# Patient Record
Sex: MALE | URBAN - METROPOLITAN AREA
[De-identification: names, ages, dates, MRNs, and addresses within clinical notes are randomized per-mention and may not be internally consistent; named-entity substitution may affect disease eponyms.]

---

## 2024-02-19 ENCOUNTER — TELEPHONE (OUTPATIENT)
Dept: PEDIATRICS | Facility: CLINIC | Age: 19
End: 2024-02-19
Payer: COMMERCIAL

## 2024-02-19 NOTE — TELEPHONE ENCOUNTER
Shon left a . He would like a referral placed for shoulder pain to a chiropractor at [facility]. His father has used [facility] and had a good experience. Thanks     [phone]

## 2024-02-29 ENCOUNTER — OFFICE VISIT (OUTPATIENT)
Dept: PEDIATRICS | Facility: CLINIC | Age: 19
End: 2024-02-29
Payer: COMMERCIAL

## 2024-02-29 VITALS — TEMPERATURE: 98.3 F | WEIGHT: 149.6 LBS

## 2024-02-29 DIAGNOSIS — G89.29 UPPER BACK PAIN, CHRONIC: Primary | ICD-10-CM

## 2024-02-29 DIAGNOSIS — M54.9 UPPER BACK PAIN, CHRONIC: Primary | ICD-10-CM

## 2024-02-29 PROBLEM — Q67.6 PECTUS EXCAVATUM: Status: ACTIVE | Noted: 2024-02-29

## 2024-02-29 PROBLEM — F32.A DEPRESSION: Status: RESOLVED | Noted: 2024-02-29 | Resolved: 2024-02-29

## 2024-02-29 PROBLEM — J01.90 ACUTE SINUSITIS: Status: RESOLVED | Noted: 2024-02-29 | Resolved: 2024-02-29

## 2024-02-29 PROBLEM — S69.92XA INJURY OF LEFT WRIST: Status: RESOLVED | Noted: 2024-02-29 | Resolved: 2024-02-29

## 2024-02-29 PROBLEM — A09 GASTROENTERITIS, INFECTIOUS: Status: RESOLVED | Noted: 2024-02-29 | Resolved: 2024-02-29

## 2024-02-29 PROCEDURE — 99213 OFFICE O/P EST LOW 20 MIN: CPT | Performed by: PEDIATRICS

## 2024-02-29 NOTE — PROGRESS NOTES
Shon Jiménez is a 18 y.o. male who presents for Back Pain (Lumbar and shoulder pain).      HPI  Shon has had chronic issues with bilateral upper back and shoulder pain.  He is concerned this may be in part due to his pectus excavatum.  He is lifting weight 3 to 4 days per week and he often has pain with bench pressing and other exercises that move his shoulders.  It is worsening over time.  He did not have one acute injury.  Objective   Temp 36.8 °C (98.3 °F) (Temporal)   Wt 67.9 kg (149 lb 9.6 oz)     Physical Exam  Gen: well appearing  Musculoskeletal: Pectus excavatum noted.  Shoulder exam normal.  Neck normal.  There is some mild pain with extension of arms overhead.  Upper extremity strength/tone normal.    Assessment/Plan   Shon has upper back pain of a suspected muscular etiology.  He was referred to PT.  Today we discussed a typical course, symptomatic treatment, and signs of worsening/when to seek medical care.

## 2024-04-05 ENCOUNTER — EVALUATION (OUTPATIENT)
Dept: PHYSICAL THERAPY | Facility: CLINIC | Age: 19
End: 2024-04-05
Payer: COMMERCIAL

## 2024-04-05 DIAGNOSIS — M54.9 UPPER BACK PAIN, CHRONIC: ICD-10-CM

## 2024-04-05 DIAGNOSIS — M25.511 BILATERAL SHOULDER PAIN: Primary | ICD-10-CM

## 2024-04-05 DIAGNOSIS — M54.2 PAIN, NECK: ICD-10-CM

## 2024-04-05 DIAGNOSIS — G89.29 UPPER BACK PAIN, CHRONIC: ICD-10-CM

## 2024-04-05 DIAGNOSIS — M25.512 BILATERAL SHOULDER PAIN: Primary | ICD-10-CM

## 2024-04-05 PROCEDURE — 97161 PT EVAL LOW COMPLEX 20 MIN: CPT | Mod: GP | Performed by: PHYSICAL THERAPIST

## 2024-04-05 PROCEDURE — 97110 THERAPEUTIC EXERCISES: CPT | Mod: GP | Performed by: PHYSICAL THERAPIST

## 2024-04-05 ASSESSMENT — ENCOUNTER SYMPTOMS
LOSS OF SENSATION IN FEET: 0
OCCASIONAL FEELINGS OF UNSTEADINESS: 0
DEPRESSION: 0

## 2024-04-05 ASSESSMENT — PAIN SCALES - GENERAL: PAINLEVEL_OUTOF10: 1

## 2024-04-05 ASSESSMENT — PAIN - FUNCTIONAL ASSESSMENT: PAIN_FUNCTIONAL_ASSESSMENT: 0-10

## 2024-04-05 NOTE — PROGRESS NOTES
Physical Therapy  Physical Therapy Orthopedic Evaluation    Patient Name: Shon Jiménez  MRN: [id number]  Today's Date: 4/5/2024  Time Calculation  Start Time: 0747  Stop Time: 0825  Time Calculation (min): 38 min    Visit Count: 1/30  Auth:yes  Insurance: [employer]      Current Problem  1. Bilateral shoulder pain  Follow Up In Physical Therapy      2. Upper back pain, chronic  Referral to Physical Therapy    Follow Up In Physical Therapy      3. Pain, neck  Follow Up In Physical Therapy          General:  General  Reason for Referral: B shoulder and neck pain  Referred By: Earl ROLDAN    Precautions:  Precautions  STEADI Fall Risk Score (The score of 4 or more indicates an increased risk of falling): 0    Pain:  Pain Assessment: 0-10  Pain Score: 1  Pain Location: Shoulder    Subjective:   Pt is a 18 y.o. y.o male presenting to the clinic with B shoulder and upper back pain. L shoulder worse. Reports that the symptoms have been present 4/5/2022. Reports JUANA insidious. Reports pain is currently 1/10, worst 6-7/10, best 0/10. Aggravated with benching, exercise generally, improved/relieved with rest. Reports the pain is achy in nature in anterior shoulder. Denies any radiating pain or N/T. Denies any previous neck or shoulder injuries previously. Reports RUE. Denies any imaging being completed. PMHx includes denies all. Denies any Red Flags.    Prior Level of Function (PLOF)  Patient previously independent with all ADLs  Exercise/Physical Activity: 3-4x week  Work/School: [employer]    Patients Living Environment: Reviewed and no concern    Primary Language: English    Patient's Goal(s) for Therapy: return to pain free working out.     Objective:  Shoulder     Cervical AROM  Cervical flexion: (80°): 100%  Cervical extension: (50°): 100%  Cervical Rotation: (80°): 100% P!  Cervical rotation left: (80°): 100% P!  Cervical sidebend right: (45°): 75%  Cervical sidebend left: (45°): 75%  Shoulder AROM  R Shoulder flexion: (180°):  170  L Shoulder flexion: (180°): 170  R shoulder abduction: (180°): 180  L Shoulder abduction: (180°): 180  R Shoulder ER: (90°): 90  L shoulder ER: (90°): 90  R shoulder IR: (70°): T4  L shoulder IR: (70°): T3  Shoulder PROM  R shoulder flexion: (180°): 180  L shoulder flexion: (180°): 180  R shoulder ER: (90°): 110  L shoulder ER: (90°): 90  R shoulder IR: (70°): 45  L shoulder IR: (70°): 30    Shoulder Strength  R shoulder flexion: (5/5): 5  L shoulder flexion: (5/5): 4  R shoulder abduction: (5/5): 5  L shoulder abduction: (5/5): 4  R shoulder ER: (5/5): 5  L shoulder ER: (5/5): 4  R shoulder IR: (5/5) : 5  L shoulder IR: (5/5): 5  Scapular MMT  R lower trapezius: (5/5): 4  L lower trapezius: (5/5): 4  R middle trapezius: (5/5): 4  L middle trapezius: (5/5): 4  L rhomboids: (5/5): 4  R serratus anterior: (5/5): 4  L serratus anterior: (5/5): 4    Shoulder Special  Painful arc: Negative: Positive  Shoulder Impingement (If 3 are positive likelihood ration +10.56 and -0.17; If 2 are positive likelihoood ration +5.03): Positive Hare Julien  Neer: (Negative): positive  Painful arc: (Negative): positive    Effusion: 0    Posture: Forward head and Rounded shoulder    Outcome Measures:  Other Measures  Oswestry Disablity Index (SAIRA): 4, 8%   Assess quick dash next appointment      Treatments:   Therapeutic Exercise  Therapeutic Exercise Activity 1: scapular row BTB 3x10  Therapeutic Exercise Activity 2: lat row BTB 3x10  Therapeutic Exercise Activity 3: modified cross body stretch x1 min B  Therapeutic Exercise Activity 4: shoulder ER isometric 10x10 sec B  Therapeutic Exercise Activity 5: cervical retractions 3x10    Assessment: Pt is a 18 y.o. y.o male presenting to the clinic with shoulder and upper back pain.  Pt demonstrates limitations in  shoulder and scapular Strength, thoracic ROM, Flexibility of UT, LS, lats, Dynamic Motor Control, and Pain. As a result, is limiting their ability to perform ADL's and their  functional activities. Signs and symptoms present are consistent with B shoulder impingement. Pt demonstrates to be a good candidate for PT, where the benefit would benefit from Strengthening, ROM, Stretching, and Motor Control Training, in order to return to PLOF.         EDUCATION: home exercise program, plan of care, activity modifications, pain management, and injury pathology   Access Code: [email or web address]  URL: [email or web address]  Date: 04/05/2024  Prepared by: Tavo Fishman    Exercises  - Seated Cervical Retraction  - 1 x daily - 7 x weekly - 3 sets - 10 reps  - Standing Cross Body Shoulder Stretch at Wall  - 1 x daily - 7 x weekly - 1--2 min hold  - Standing Shoulder Row with Anchored Resistance  - 1 x daily - 7 x weekly - 3 sets - 10 reps  - Shoulder extension with resistance - Neutral  - 1 x daily - 7 x weekly - 3 sets - 10 reps  - Standing Isometric Shoulder External Rotation with Doorway  - 1 x daily - 7 x weekly - 10 reps - 10 sec hold    Plan:      Planned Interventions include: therapeutic exercise, self-care home management, manual therapy, therapeutic activities, gait training, neuromuscular coordination, vasopneumatic, dry needling, aquatic therapy  Frequency: 1 x Week  Duration: 12 Weeks  Good potential for rehab  Pt agreed with this plan.     Goals:  Active       PT Problem       PT Goal 1       Start:  04/05/24    Expected End:  05/17/24       ?Short Term Goals  1. Pt will demonstrated improvements in shoulder/scapular strength, specifically to 4+/5 in 6 weeks, in order to progress back to PLOF.    2. Pt will demonstrate improvements in shoulder Passive ROM, specifically flexion >170 in 6 weeks, in order to improve functional level.     3. Pt will demonstrate the ability to perform overhead activities with minimal pain (2-3/10 pain) in 6 weeks, in order to return to pain free prior level.     4. Pt will demonstrate improved standing posture and proper shoulder and scapular  control with overhead activities in 6 weeks, in order to decrease stress and strain with activities.    5. Pt will demonstrate Ind ability with HEP.           PT Goal 2       Start:  04/05/24    Expected End:  06/28/24       Long Term Goals  1. Pt will demonstrated improvements and symmetry in shoulder/scapular strength to 5/5 in 12 weeks, in order to return to PLOF.    2. Pt will demonstrate full and symmetrical Active/Passive shoulder ROM, specifically in 12 weeks, in order to return to prior overhead function.    3. Pt will demonstrate the ability to complete all functional activities with no pain in 12 weeks.     4. Decrease quick dash by 6 points in 12 weeks.              Tavo Fishman, PT, SCS, CSCS

## 2024-04-12 ENCOUNTER — TREATMENT (OUTPATIENT)
Dept: PHYSICAL THERAPY | Facility: CLINIC | Age: 19
End: 2024-04-12
Payer: COMMERCIAL

## 2024-04-12 DIAGNOSIS — M54.2 PAIN, NECK: ICD-10-CM

## 2024-04-12 DIAGNOSIS — M25.512 BILATERAL SHOULDER PAIN: ICD-10-CM

## 2024-04-12 DIAGNOSIS — M54.9 UPPER BACK PAIN, CHRONIC: ICD-10-CM

## 2024-04-12 DIAGNOSIS — M25.511 BILATERAL SHOULDER PAIN: ICD-10-CM

## 2024-04-12 DIAGNOSIS — G89.29 UPPER BACK PAIN, CHRONIC: ICD-10-CM

## 2024-04-12 PROCEDURE — 97110 THERAPEUTIC EXERCISES: CPT | Mod: GP | Performed by: PHYSICAL THERAPIST

## 2024-04-12 ASSESSMENT — PAIN - FUNCTIONAL ASSESSMENT: PAIN_FUNCTIONAL_ASSESSMENT: 0-10

## 2024-04-12 ASSESSMENT — PAIN SCALES - GENERAL: PAINLEVEL_OUTOF10: 0 - NO PAIN

## 2024-04-12 NOTE — PROGRESS NOTES
Physical Therapy  Physical Therapy Treatment    Patient Name: Shon Jiménez  MRN: [id number]  Today's Date: 4/12/2024  Time Calculation  Start Time: 0745  Stop Time: 0825  Time Calculation (min): 40 min    Visit Count: 2/30  Auth:yes  Insurance: [facility]  15 visits including today thru 07/19/24     Current Problem  1. Upper back pain, chronic  Follow Up In Physical Therapy      2. Bilateral shoulder pain  Follow Up In Physical Therapy      3. Pain, neck  Follow Up In Physical Therapy          General  Reason for Referral: B shoulder and neck pain  Referred By: Earl ROLDAN    Pain  Pain Assessment: 0-10  Pain Score: 0 - No pain  Pain Location: Shoulder    Subjective:   Patient reports that he was sore in his scapular muscles for two days after previous session, but improved now.     HEP Compliance: good.     Objective:   Rounded shoulders, forward head.    Treatments:   Therapeutic Exercise  Therapeutic Exercise Activity 1: cervical snag ext x30  Therapeutic Exercise Activity 2: cervical retractions on pad x30  Therapeutic Exercise Activity 3: cervical retraction w/rotation x20  Therapeutic Exercise Activity 4: icepickers 3x12 GTB  Therapeutic Exercise Activity 5: pec stretch x1 min B  Therapeutic Exercise Activity 6: thoracic ext 4x10 over half bolster  Therapeutic Exercise Activity 7: open books x15 B  Therapeutic Exercise Activity 8: Prone T 6x6 sec B  Therapeutic Exercise Activity 9: Prone Y 6x6 sec B      Assessment: The focus of the session was Strengthening and soft tissue massage. The pt demonstrated Good tolerance to the noted exercises today. The pt is demonstrated Good progress in skilled rehab at this time. The pt is still limited in overall Strength at this time. The pt continues to be a good candidate for skilled PT, in order to further improve Strength.          Education: Access Code: [email or web address]  URL: [email or web address]  Date: 04/12/2024  Prepared by: Tavo Redman  -  Seated Cervical Retraction  - 1 x daily - 7 x weekly - 3 sets - 10 reps  - Supine Cervical Rotation AROM on Flat Ball  - 1 x daily - 7 x weekly - 2 sets - 10 reps  - Doorway Pec Stretch at 60 Degrees Abduction with Arm Straight  - 1 x daily - 7 x weekly - 1-2 min hold  - Standing Cross Body Shoulder Stretch at Wall  - 1 x daily - 7 x weekly - 1--2 min hold  - Shoulder extension with resistance - Neutral  - 1 x daily - 7 x weekly - 3 sets - 10 reps  - Shoulder External Rotation and Scapular Retraction with Resistance  - 1 x daily - 7 x weekly - 3 sets - 12 reps  - Prone Single Arm Shoulder Horizontal Abduction with Scapular Retraction and Palm Down  - 1 x daily - 7 x weekly - 2 sets - 6 reps - 6 sec hold    Plan: continue with scapular strengthening.        Goals:  Active       PT Problem       PT Goal 1       Start:  04/05/24    Expected End:  05/17/24       ?Short Term Goals  1. Pt will demonstrated improvements in shoulder/scapular strength, specifically to 4+/5 in 6 weeks, in order to progress back to PLOF.    2. Pt will demonstrate improvements in shoulder Passive ROM, specifically flexion >170 in 6 weeks, in order to improve functional level.     3. Pt will demonstrate the ability to perform overhead activities with minimal pain (2-3/10 pain) in 6 weeks, in order to return to pain free prior level.     4. Pt will demonstrate improved standing posture and proper shoulder and scapular control with overhead activities in 6 weeks, in order to decrease stress and strain with activities.    5. Pt will demonstrate Ind ability with HEP.           PT Goal 2       Start:  04/05/24    Expected End:  06/28/24       Long Term Goals  1. Pt will demonstrated improvements and symmetry in shoulder/scapular strength to 5/5 in 12 weeks, in order to return to PLOF.    2. Pt will demonstrate full and symmetrical Active/Passive shoulder ROM, specifically in 12 weeks, in order to return to prior overhead function.    3. Pt will  demonstrate the ability to complete all functional activities with no pain in 12 weeks.     4. Decrease quick dash by 6 points in 12 weeks.              Tavo Fishman, PT, SCS, CSCS

## 2024-04-15 ENCOUNTER — TREATMENT (OUTPATIENT)
Dept: PHYSICAL THERAPY | Facility: CLINIC | Age: 19
End: 2024-04-15
Payer: COMMERCIAL

## 2024-04-15 DIAGNOSIS — M54.2 PAIN, NECK: ICD-10-CM

## 2024-04-15 DIAGNOSIS — G89.29 UPPER BACK PAIN, CHRONIC: ICD-10-CM

## 2024-04-15 DIAGNOSIS — M54.9 UPPER BACK PAIN, CHRONIC: ICD-10-CM

## 2024-04-15 DIAGNOSIS — M25.512 BILATERAL SHOULDER PAIN: ICD-10-CM

## 2024-04-15 DIAGNOSIS — M25.511 BILATERAL SHOULDER PAIN: ICD-10-CM

## 2024-04-15 PROCEDURE — 97110 THERAPEUTIC EXERCISES: CPT | Mod: GP | Performed by: PHYSICAL THERAPIST

## 2024-04-15 ASSESSMENT — PAIN SCALES - GENERAL: PAINLEVEL_OUTOF10: 0 - NO PAIN

## 2024-04-15 ASSESSMENT — PAIN - FUNCTIONAL ASSESSMENT: PAIN_FUNCTIONAL_ASSESSMENT: 0-10

## 2024-04-15 NOTE — PROGRESS NOTES
Physical Therapy  Physical Therapy Treatment    Patient Name: Shon Jiménez  MRN: [id number]  Today's Date: 4/15/2024  Time Calculation  Start Time: 1713  Stop Time: 1753  Time Calculation (min): 40 min    Visit Count: 3/30  Auth:yes  Insurance: [facility]  15 visits including today thru 07/19/24     Current Problem  1. Upper back pain, chronic  Follow Up In Physical Therapy      2. Bilateral shoulder pain  Follow Up In Physical Therapy      3. Pain, neck  Follow Up In Physical Therapy          General  Reason for Referral: B shoulder and neck pain  Referred By: Earl ROLDAN    Pain  Pain Assessment: 0-10  Pain Score: 0 - No pain  Pain Location: Shoulder    Subjective:   Patient reports that he is doing well, no pain.    HEP Compliance: good.     Objective:   Improving scapular control    Treatments:   Therapeutic Exercise  Therapeutic Exercise Activity 1: ATYTA's GTB 3x8  Therapeutic Exercise Activity 2: [place] GTB 3x8  Therapeutic Exercise Activity 3: 90/90 carry 4 kg x3 laps B  Therapeutic Exercise Activity 4: Prone row 8 kg 3x12 B  Therapeutic Exercise Activity 5: serratus wall slide 3x10  Therapeutic Exercise Activity 6: wall push up plus 3x10  Therapeutic Exercise Activity 7: open books x15 B  Therapeutic Exercise Activity 8: icepickers 3x12 BTB      Assessment: The focus of the session was Strengthening. The pt demonstrated Good tolerance to the noted exercises today. The pt is demonstrated Good progress in skilled rehab at this time. The pt is still limited in overall Strength, Motor control, and Pain at this time. The pt continues to be a good candidate for skilled PT, in order to further improve Strength, Motor control, and Pain.         Education: Continue with noted HEP.     Plan: continue with scapular strengthening.        Goals:  Active       PT Problem       PT Goal 1       Start:  04/05/24    Expected End:  05/17/24       ?Short Term Goals  1. Pt will demonstrated improvements in shoulder/scapular  strength, specifically to 4+/5 in 6 weeks, in order to progress back to PLOF.    2. Pt will demonstrate improvements in shoulder Passive ROM, specifically flexion >170 in 6 weeks, in order to improve functional level.     3. Pt will demonstrate the ability to perform overhead activities with minimal pain (2-3/10 pain) in 6 weeks, in order to return to pain free prior level.     4. Pt will demonstrate improved standing posture and proper shoulder and scapular control with overhead activities in 6 weeks, in order to decrease stress and strain with activities.    5. Pt will demonstrate Ind ability with HEP.           PT Goal 2       Start:  04/05/24    Expected End:  06/28/24       Long Term Goals  1. Pt will demonstrated improvements and symmetry in shoulder/scapular strength to 5/5 in 12 weeks, in order to return to PLOF.    2. Pt will demonstrate full and symmetrical Active/Passive shoulder ROM, specifically in 12 weeks, in order to return to prior overhead function.    3. Pt will demonstrate the ability to complete all functional activities with no pain in 12 weeks.     4. Decrease quick dash by 6 points in 12 weeks.              Tavo Fishman, PT, SCS, CSCS

## 2024-04-22 ENCOUNTER — TREATMENT (OUTPATIENT)
Dept: PHYSICAL THERAPY | Facility: CLINIC | Age: 19
End: 2024-04-22
Payer: COMMERCIAL

## 2024-04-22 DIAGNOSIS — G89.29 UPPER BACK PAIN, CHRONIC: ICD-10-CM

## 2024-04-22 DIAGNOSIS — M25.511 BILATERAL SHOULDER PAIN: ICD-10-CM

## 2024-04-22 DIAGNOSIS — M54.9 UPPER BACK PAIN, CHRONIC: ICD-10-CM

## 2024-04-22 DIAGNOSIS — M54.2 PAIN, NECK: ICD-10-CM

## 2024-04-22 DIAGNOSIS — M25.512 BILATERAL SHOULDER PAIN: ICD-10-CM

## 2024-04-22 PROCEDURE — 97110 THERAPEUTIC EXERCISES: CPT | Mod: GP | Performed by: PHYSICAL THERAPIST

## 2024-04-22 ASSESSMENT — PAIN SCALES - GENERAL: PAINLEVEL_OUTOF10: 0 - NO PAIN

## 2024-04-22 ASSESSMENT — PAIN - FUNCTIONAL ASSESSMENT: PAIN_FUNCTIONAL_ASSESSMENT: 0-10

## 2024-04-22 NOTE — PROGRESS NOTES
Physical Therapy  Physical Therapy Treatment    Patient Name: Shon Jiménez  MRN: [id number]  Today's Date: 4/22/2024  Time Calculation  Start Time: 1618  Stop Time: 1700  Time Calculation (min): 42 min    Visit Count: 4/30  Auth:yes  Insurance: [facility]  15 visits including today thru 07/19/24     Current Problem  1. Upper back pain, chronic  Follow Up In Physical Therapy      2. Bilateral shoulder pain  Follow Up In Physical Therapy      3. Pain, neck  Follow Up In Physical Therapy          General  Reason for Referral: B shoulder and neck pain  Referred By: Earl ROLDAN    Pain  Pain Assessment: 0-10  Pain Score: 0 - No pain  Pain Location: Shoulder    Subjective:   Patient reports that shoulder is feeling good.     HEP Compliance: good.     Objective:   Decreased eccentric control, increased bicep loading with floor pressing    Treatments:   Therapeutic Exercise  Therapeutic Exercise Activity 1: ATYTA's GTB 3x8  Therapeutic Exercise Activity 2: 90/90 carry 4 kg x3 laps B  Therapeutic Exercise Activity 3: overhead carry x4 kg  Therapeutic Exercise Activity 4: shoulder flexion with RTB 3x12  Therapeutic Exercise Activity 5: serratus wall slide 3x10  Therapeutic Exercise Activity 6: Floor pressing 3x8  Therapeutic Exercise Activity 7: wall push up plus 3x10      Assessment: The focus of the session was Strengthening and Dynamic Stability Training. The pt demonstrated Good tolerance to the noted exercises today. The pt is demonstrated Improving progress in skilled rehab at this time. The pt reported pain with floor pressing today in anterior shoulder. The pt is still limited in overall Strength, Motor control, and Pain at this time. The pt continues to be a good candidate for skilled PT, in order to further improve Strength, Motor control, and Pain.         Education: Addition of banded scaption raise    Plan: Continue with floor pressing mechanics.        Goals:  Active       PT Problem       PT Goal 1       Start:   04/05/24    Expected End:  05/17/24       ?Short Term Goals  1. Pt will demonstrated improvements in shoulder/scapular strength, specifically to 4+/5 in 6 weeks, in order to progress back to PLOF.    2. Pt will demonstrate improvements in shoulder Passive ROM, specifically flexion >170 in 6 weeks, in order to improve functional level.     3. Pt will demonstrate the ability to perform overhead activities with minimal pain (2-3/10 pain) in 6 weeks, in order to return to pain free prior level.     4. Pt will demonstrate improved standing posture and proper shoulder and scapular control with overhead activities in 6 weeks, in order to decrease stress and strain with activities.    5. Pt will demonstrate Ind ability with HEP.           PT Goal 2       Start:  04/05/24    Expected End:  06/28/24       Long Term Goals  1. Pt will demonstrated improvements and symmetry in shoulder/scapular strength to 5/5 in 12 weeks, in order to return to PLOF.    2. Pt will demonstrate full and symmetrical Active/Passive shoulder ROM, specifically in 12 weeks, in order to return to prior overhead function.    3. Pt will demonstrate the ability to complete all functional activities with no pain in 12 weeks.     4. Decrease quick dash by 6 points in 12 weeks.              Tavo Fishman, PT, SCS, CSCS

## 2024-04-29 ENCOUNTER — TREATMENT (OUTPATIENT)
Dept: PHYSICAL THERAPY | Facility: CLINIC | Age: 19
End: 2024-04-29
Payer: COMMERCIAL

## 2024-04-29 DIAGNOSIS — M25.511 BILATERAL SHOULDER PAIN: ICD-10-CM

## 2024-04-29 DIAGNOSIS — M54.2 PAIN, NECK: ICD-10-CM

## 2024-04-29 DIAGNOSIS — G89.29 UPPER BACK PAIN, CHRONIC: ICD-10-CM

## 2024-04-29 DIAGNOSIS — M25.512 BILATERAL SHOULDER PAIN: ICD-10-CM

## 2024-04-29 DIAGNOSIS — M54.9 UPPER BACK PAIN, CHRONIC: ICD-10-CM

## 2024-04-29 PROCEDURE — 97140 MANUAL THERAPY 1/> REGIONS: CPT | Mod: GP | Performed by: PHYSICAL THERAPIST

## 2024-04-29 PROCEDURE — 97110 THERAPEUTIC EXERCISES: CPT | Mod: GP | Performed by: PHYSICAL THERAPIST

## 2024-04-29 ASSESSMENT — PAIN SCALES - GENERAL: PAINLEVEL_OUTOF10: 0 - NO PAIN

## 2024-04-29 ASSESSMENT — PAIN - FUNCTIONAL ASSESSMENT: PAIN_FUNCTIONAL_ASSESSMENT: 0-10

## 2024-04-29 NOTE — PROGRESS NOTES
Physical Therapy  Physical Therapy Treatment    Patient Name: Shon Jiménez  MRN: [id number]  Today's Date: 4/29/2024  Time Calculation  Start Time: 1625  Stop Time: 1705  Time Calculation (min): 40 min    Visit Count: 5/30  Auth:yes  Insurance: [facility]  15 visits including today thru 07/19/24     Current Problem  1. Upper back pain, chronic  Follow Up In Physical Therapy      2. Bilateral shoulder pain  Follow Up In Physical Therapy      3. Pain, neck  Follow Up In Physical Therapy          General  Reason for Referral: B shoulder and neck pain  Referred By: Earl ROLDAN    Pain  Pain Assessment: 0-10  Pain Score: 0 - No pain  Pain Location: Shoulder    Subjective:   Patient reports that the exercises are well, does report symptoms around his clavicle intermittently bilaterally    HEP Compliance: good    Objective:   Decreased overhead endurance    Treatments:   Therapeutic Exercise  Therapeutic Exercise Activity 1: scapular punch 3x12 10#  Therapeutic Exercise Activity 2: floor press 3x12 10#  Therapeutic Exercise Activity 3: overhead carry x4 kg x3 laps B  Therapeutic Exercise Activity 4: overhead carry with band 4 kg x3 laps B  Therapeutic Exercise Activity 5: serratus wall slide BTB 3x12  Therapeutic Exercise Activity 6: wall push up plus 3x10    Manual Therapy  Manual Therapy Activity 1: GH grade III posterior, inferior, lateral  x10 min      Assessment: The focus of the session was Strengthening. The pt demonstrated Good tolerance to the noted exercises today. The pt is demonstrated Improving progress in skilled rehab at this time. Re-introduced light pressing in floor manner today with improved mechanics and no pain. Overhead carry was progressed to with a resistance band. The pt fatigue quickly with overhead carry progression today. The pt is still limited in overall Strength especially overhead at this time. The pt continues to be a good candidate for skilled PT, in order to further improve Strength, Motor  control, and Pain.         Education: Addition of light floor pressing and wall push ups 3x12 both    Plan: continue with overhead strengthening.        Goals:  Active       PT Problem       PT Goal 1       Start:  04/05/24    Expected End:  05/17/24       ?Short Term Goals  1. Pt will demonstrated improvements in shoulder/scapular strength, specifically to 4+/5 in 6 weeks, in order to progress back to PLOF.    2. Pt will demonstrate improvements in shoulder Passive ROM, specifically flexion >170 in 6 weeks, in order to improve functional level.     3. Pt will demonstrate the ability to perform overhead activities with minimal pain (2-3/10 pain) in 6 weeks, in order to return to pain free prior level.     4. Pt will demonstrate improved standing posture and proper shoulder and scapular control with overhead activities in 6 weeks, in order to decrease stress and strain with activities.    5. Pt will demonstrate Ind ability with HEP.           PT Goal 2       Start:  04/05/24    Expected End:  06/28/24       Long Term Goals  1. Pt will demonstrated improvements and symmetry in shoulder/scapular strength to 5/5 in 12 weeks, in order to return to PLOF.    2. Pt will demonstrate full and symmetrical Active/Passive shoulder ROM, specifically in 12 weeks, in order to return to prior overhead function.    3. Pt will demonstrate the ability to complete all functional activities with no pain in 12 weeks.     4. Decrease quick dash by 6 points in 12 weeks.              Tavo Fishman, PT, SCS, CSCS

## 2024-05-08 ENCOUNTER — TREATMENT (OUTPATIENT)
Dept: PHYSICAL THERAPY | Facility: CLINIC | Age: 19
End: 2024-05-08
Payer: COMMERCIAL

## 2024-05-08 DIAGNOSIS — M25.511 BILATERAL SHOULDER PAIN: ICD-10-CM

## 2024-05-08 DIAGNOSIS — M25.512 BILATERAL SHOULDER PAIN: ICD-10-CM

## 2024-05-08 DIAGNOSIS — G89.29 UPPER BACK PAIN, CHRONIC: ICD-10-CM

## 2024-05-08 DIAGNOSIS — M54.9 UPPER BACK PAIN, CHRONIC: ICD-10-CM

## 2024-05-08 DIAGNOSIS — M54.2 PAIN, NECK: ICD-10-CM

## 2024-05-08 PROCEDURE — 97110 THERAPEUTIC EXERCISES: CPT | Mod: GP

## 2024-05-08 PROCEDURE — 97112 NEUROMUSCULAR REEDUCATION: CPT | Mod: GP

## 2024-05-08 ASSESSMENT — PAIN SCALES - GENERAL: PAINLEVEL_OUTOF10: 0 - NO PAIN

## 2024-05-08 ASSESSMENT — PAIN - FUNCTIONAL ASSESSMENT: PAIN_FUNCTIONAL_ASSESSMENT: 0-10

## 2024-05-08 NOTE — PROGRESS NOTES
Physical Therapy  Physical Therapy Treatment    Patient Name: Shon Jiménez  MRN: [id number]  Today's Date: 5/8/2024  Time Calculation  Start Time: 1618  Stop Time: 1718  Time Calculation (min): 60 min    Visit Count: 6/30  Auth:yes  [facility]  15 visits including today thru 07/19/24     Current Problem  1. Upper back pain, chronic  Follow Up In Physical Therapy      2. Bilateral shoulder pain  Follow Up In Physical Therapy      3. Pain, neck  Follow Up In Physical Therapy            General  Reason for Referral: B shoulder and neck pain  Referred By: Earl ROLDAN    Pain  Pain Assessment: 0-10  Pain Score: 0 - No pain  Pain Location: Shoulder    Subjective:   Patient reports is not having pain at this time, feels more chest stimulus with weight training, and feels L shoulder is weaker and gets painful quicker than R.    HEP Compliance: good    Objective:   IR at 90 deg abd= 35 each side before subscap and ant delt mobility; 70 on L after and 50 on R after.    Treatments:   Therapeutic Exercise  Therapeutic Exercise Activity 1: banded subscap stretch (blue x5 3 breaths each)  Therapeutic Exercise Activity 2: KB arm bar (pink 2x5 ea)  Therapeutic Exercise Activity 3: KB windmill (pink x10 ea)  Therapeutic Exercise Activity 4: ball on wall oscillations (f-b& s-s x10 ea for both)  Therapeutic Exercise Activity 5: ball on wall hip hinge (yellow- x8 hinges each)  Therapeutic Exercise Activity 6: lat row to side bend (10lbs x 12 ea)  Therapeutic Exercise Activity 7: BB bench [id number] ([id number])           Assessment:   The focus of the session was Strengthening, ROM, Motor Control, and Dynamic Stability Training. The pt demonstrated Good tolerance to the noted exercises today and after priming periscapular muscles and lats before bench was able to barbell bench full range of motion without pain for first time in months. Pt displayed difficulty with coordination KB arm bar, windmill and ball on wall  exercises showing poor dynamic stability and coordination, but continued to improve with tactile, verbal, and kinesthetic cueing for posture, position , and muscle activation. The pt is demonstrated Good progress in skilled rehab at this time. The pt is still limited in overall Strength, Flexibility, and Motor control at this time. The pt continues to be a good candidate for skilled PT, in order to further improve Strength, Flexibility, and Motor control.            Education: priming lats and scap stabilizers before bench    Plan: continue with overhead strengthening; lower trap and serratus. OHP/ band perts with rib cage down, RNT bench, lat strength BB jeremy Frederick PT, DPT #[id number]

## 2024-05-15 ENCOUNTER — TREATMENT (OUTPATIENT)
Dept: PHYSICAL THERAPY | Facility: CLINIC | Age: 19
End: 2024-05-15
Payer: COMMERCIAL

## 2024-05-15 DIAGNOSIS — M25.511 BILATERAL SHOULDER PAIN: ICD-10-CM

## 2024-05-15 DIAGNOSIS — M54.9 UPPER BACK PAIN, CHRONIC: ICD-10-CM

## 2024-05-15 DIAGNOSIS — G89.29 UPPER BACK PAIN, CHRONIC: ICD-10-CM

## 2024-05-15 DIAGNOSIS — M25.512 BILATERAL SHOULDER PAIN: ICD-10-CM

## 2024-05-15 DIAGNOSIS — M54.2 PAIN, NECK: ICD-10-CM

## 2024-05-15 PROCEDURE — 97112 NEUROMUSCULAR REEDUCATION: CPT | Mod: GP

## 2024-05-15 PROCEDURE — 97110 THERAPEUTIC EXERCISES: CPT | Mod: GP

## 2024-05-15 ASSESSMENT — PAIN - FUNCTIONAL ASSESSMENT: PAIN_FUNCTIONAL_ASSESSMENT: 0-10

## 2024-05-15 ASSESSMENT — PAIN SCALES - GENERAL: PAINLEVEL_OUTOF10: 0 - NO PAIN

## 2024-05-15 NOTE — PROGRESS NOTES
Physical Therapy  Physical Therapy Treatment    Patient Name: Shon Jiménez  MRN: [id number]  Today's Date: 5/15/2024  Time Calculation  Start Time: 1622  Stop Time: 1723  Time Calculation (min): 61 min    Visit Count: 7/30  Auth:yes  Insurance: [facility]  15 visits including today thru 07/19/24     Current Problem  1. Upper back pain, chronic  Follow Up In Physical Therapy      2. Bilateral shoulder pain  Follow Up In Physical Therapy      3. Pain, neck  Follow Up In Physical Therapy              General  Reason for Referral: B shoulder and neck pain  Referred By: Earl ROLDAN    Pain  Pain Assessment: 0-10  Pain Score: 0 - No pain  Pain Location: Shoulder    Subjective:   Patient reports feeling really good and starting to bench pain free.   HEP Compliance: good    Objective:   Noted anterior translation of clavicle on sternum on R with horizontal abduction    Treatments:   Therapeutic Exercise  Therapeutic Exercise Activity 1: banded subscap stretch (blue x5 3 breaths each)  Therapeutic Exercise Activity 2: banded shld matrix (RTB : 3x5 for all)  Therapeutic Exercise Activity 3: banded roller serratus raise (RTB w/ roller 3x12)  Therapeutic Exercise Activity 4: lock 1 (2x20-2lbs)  Therapeutic Exercise Activity 5: lock 2 (2x20-2lbs)  Therapeutic Exercise Activity 6: lock 3 (2x20-2lbs)  Therapeutic Exercise Activity 7: prone 90/90 ER to raise           Assessment:   The focus of the session was Strengthening, ROM, Stretching, Motor Control, and Dynamic Stability Training. The pt demonstrated Good and Improving tolerance to the noted exercises today. The pt is demonstrated Good and Improving progress in skilled rehab at this time. The pt is still limited in overall Strength, Flexibility, and Motor control at this time. The pt continues to be a good candidate for skilled PT, in order to further improve Strength, Flexibility, Motor control, and Pain. Pt however did display anterior subluxation of clavicle on R side at  SCJ that was resolved with both manual pressure as well as after increased rotator cuff and rear delt activation.             Education: priming lats and scap stabilizers before bench    Plan: continue with overhead strengthening add rhythmic stabs; lower trap and serratus. OHP/ band perts with rib cage down, RNT bench, lat strength BB smash continue prone scap exercise, rear delt strength. 45 deg teres pull down scap pull up       Shukri Frederick PT, DPT #[id number]

## 2024-05-22 ENCOUNTER — APPOINTMENT (OUTPATIENT)
Dept: PHYSICAL THERAPY | Facility: CLINIC | Age: 19
End: 2024-05-22
Payer: COMMERCIAL

## 2024-05-22 NOTE — PROGRESS NOTES
Physical Therapy  Physical Therapy Treatment    Patient Name: Shon Jiménez  MRN: [id number]  Today's Date: 5/22/2024       Visit Count: 8/30  Auth:yes  Insurance: [facility]  15 visits including today thru 07/19/24     Current Problem  No diagnosis found.          General       Pain       Subjective:   Patient reports feeling really good and starting to bench pain free.   HEP Compliance: good    Objective:   Noted anterior translation of clavicle on sternum on R with horizontal abduction    Treatments:               Assessment:   The focus of the session was {Treatment:64399}. The pt demonstrated {tolerance to rehab:45006} tolerance to the noted exercises today. The pt is demonstrated {tolerance to rehab:66461} progress in skilled rehab at this time. The pt is still limited in overall {limitations:74986} at this time. The pt continues to be a good candidate for skilled PT, in order to further improve {limitations:98596}.              Education: priming lats and scap stabilizers before bench    Plan: continue with overhead strengthening add rhythmic stabs; lower trap and serratus. OHP/ band perts with rib cage down, RNT bench, lat strength BB smash continue prone scap exercise, rear delt strength. 45 deg teres pull down scap pull up       Shukri Frederick PT, DPT #[id number]

## 2024-05-29 ENCOUNTER — TREATMENT (OUTPATIENT)
Dept: PHYSICAL THERAPY | Facility: CLINIC | Age: 19
End: 2024-05-29
Payer: COMMERCIAL

## 2024-05-29 DIAGNOSIS — M25.512 BILATERAL SHOULDER PAIN: ICD-10-CM

## 2024-05-29 DIAGNOSIS — M54.9 UPPER BACK PAIN, CHRONIC: ICD-10-CM

## 2024-05-29 DIAGNOSIS — G89.29 UPPER BACK PAIN, CHRONIC: ICD-10-CM

## 2024-05-29 DIAGNOSIS — M54.2 PAIN, NECK: ICD-10-CM

## 2024-05-29 DIAGNOSIS — M25.511 BILATERAL SHOULDER PAIN: ICD-10-CM

## 2024-05-29 PROCEDURE — 97110 THERAPEUTIC EXERCISES: CPT | Mod: GP

## 2024-05-29 PROCEDURE — 97112 NEUROMUSCULAR REEDUCATION: CPT | Mod: GP

## 2024-05-29 ASSESSMENT — PAIN - FUNCTIONAL ASSESSMENT: PAIN_FUNCTIONAL_ASSESSMENT: 0-10

## 2024-05-29 ASSESSMENT — PAIN SCALES - GENERAL: PAINLEVEL_OUTOF10: 0 - NO PAIN

## 2024-05-29 NOTE — PROGRESS NOTES
Physical Therapy  Physical Therapy Treatment    Patient Name: Shon Jiménez  MRN: [id number]  Today's Date: 2024  Time Calculation  Start Time: 659  Stop Time: 746  Time Calculation (min): 47 min    Visit Count:   Auth:yes  Insurance: [facility]  15 visits including today thru 24     Current Problem  1. Upper back pain, chronic  Follow Up In Physical Therapy      2. Bilateral shoulder pain  Follow Up In Physical Therapy      3. Pain, neck  Follow Up In Physical Therapy                General  Reason for Referral: B shoulder and neck pain  Referred By: Earl ROLDAN    Pain  Pain Assessment: 0-10  Pain Score: 0 - No pain  Pain Location: Shoulder    Subjective:   Patient reports being able to sleep in better position with advice to use pillow under arm, laying on side. Not having as much pain, still subluxes every now and then with horizontal abduction movements. Notices more pain at end of the day , unable to maintain /fight bad posture.  HEP Compliance: good    Objective:   Noted anterior translation of clavicle on sternum on R with horizontal abduction    Treatments:   Therapeutic Exercise  Therapeutic Exercise Activity 1: UBE (fwd- 2 min/bwd 2 min; 4 res)  Therapeutic Exercise Activity 2: banded ER (3x10-5 sec)  Therapeutic Exercise Activity 3: band pullapart (3x12-5sec)  Therapeutic Exercise Activity 4: chicken wing  Therapeutic Exercise Activity 5: wall plate LT hinge (x10 ea-5lbs)  Therapeutic Exercise Activity 6: RNT bench (2 or85-135: [id number])  Therapeutic Exercise Activity 7: bench (135 x5, 115 3x6)  Therapeutic Exercise Activity 8: lat pulldown (90 x12 ; 100 x8)         TE x2 NMR x1  Assessment:   Pt displayed excellent progress in posterior engagement for shoulder and thoracoappendicular muscles. Decreased all pain with bench and OH movements and is progressing towards PLOF. RNT bench and cueing helped to improve lat engagement to improve form and kinesthetic awareness of bar and weight  in space as well as UE in space.             Education: priming lats and scap stabilizers before bench    Plan:     continue with overhead strengthening add rhythmic stabs; lower trap and serratus. OHP/ band perts with rib cage down, RNT bench, lat strength BB smash continue prone scap exercise, rear delt strength. 45 deg teres pull down scap pull up       Shukri Frederick PT, DPT #[id number]

## 2024-06-05 ENCOUNTER — TREATMENT (OUTPATIENT)
Dept: PHYSICAL THERAPY | Facility: CLINIC | Age: 19
End: 2024-06-05
Payer: COMMERCIAL

## 2024-06-05 DIAGNOSIS — M25.511 BILATERAL SHOULDER PAIN: ICD-10-CM

## 2024-06-05 DIAGNOSIS — M54.2 PAIN, NECK: ICD-10-CM

## 2024-06-05 DIAGNOSIS — G89.29 UPPER BACK PAIN, CHRONIC: ICD-10-CM

## 2024-06-05 DIAGNOSIS — M25.512 BILATERAL SHOULDER PAIN: ICD-10-CM

## 2024-06-05 DIAGNOSIS — M54.9 UPPER BACK PAIN, CHRONIC: ICD-10-CM

## 2024-06-05 PROCEDURE — 97112 NEUROMUSCULAR REEDUCATION: CPT | Mod: GP

## 2024-06-05 PROCEDURE — 97110 THERAPEUTIC EXERCISES: CPT | Mod: GP

## 2024-06-05 ASSESSMENT — PAIN SCALES - GENERAL: PAINLEVEL_OUTOF10: 0 - NO PAIN

## 2024-06-05 ASSESSMENT — PAIN - FUNCTIONAL ASSESSMENT: PAIN_FUNCTIONAL_ASSESSMENT: 0-10

## 2024-06-05 NOTE — PROGRESS NOTES
Physical Therapy  Physical Therapy Treatment    Patient Name: Shon Jiménez  MRN: [id number]  Today's Date: 6/5/2024       Visit Count: 9/30  Auth:yes  Insurance: [facility]  15 visits including today thru 07/19/24     Current Problem  1. Upper back pain, chronic  Follow Up In Physical Therapy      2. Bilateral shoulder pain  Follow Up In Physical Therapy      3. Pain, neck  Follow Up In Physical Therapy                  General  Reason for Referral: B shoulder and neck pain  Referred By: Earl ROLDAN    Pain  Pain Assessment: 0-10  Pain Score: 0 - No pain  Pain Location: Shoulder    Subjective:   Patient reports feeling good, did not lift yesterday so feeling less fatigued. Collarbone pain has been good lately.  HEP Compliance: good    Objective:   Noted anterior translation of clavicle on sternum on R with horizontal abduction    Treatments:   Therapeutic Exercise  Therapeutic Exercise Activity 1: prone ER (x20)  Therapeutic Exercise Activity 2: prone W (x20)  Therapeutic Exercise Activity 3: darlin & devil- prone (x15)  Therapeutic Exercise Activity 4: KB t-spine rot (8kg 2x5 ea)  Therapeutic Exercise Activity 5: banded thread the needle (org x10 ea)  Therapeutic Exercise Activity 6: lock 3 (2x20-2lbs)  Therapeutic Exercise Activity 7: PVC t spine rot to SB (x15 ea)  Therapeutic Exercise Activity 8: chicken wing (org 2x15ea)  Therapeutic Exercise Activity 9: 45 deg pulldown (20lbs 2x15 ea)         TE x3 NMR x1  Assessment:   The focus of the session was Strengthening, ROM, Stretching, Motor Control, and Dynamic Stability Training. The pt demonstrated Good and Improving tolerance to the noted exercises today. The pt is demonstrated Good and Improving progress in skilled rehab at this time. The pt is still limited in overall Strength, ROM, Flexibility, Motor control, and Pain at this time. The pt continues to be a good candidate for skilled PT, in order to further improve Strength, ROM, Flexibility, Motor control, and  Pain. Pt able to improve scap and shoulder position with extensive cueing with pulldown and chickkn wing  for scap depression and retraction to continue to progress all functional strength for pushing, pulling, OH lifting and carrying.               Education: priming lats and scap stabilizers before bench    Plan:   PN Next- full re-check and 1 RM bench: 6RM shld press    continue with overhead strengthening add rhythmic stabs; lower trap and serratus. OHP/ band perts with rib cage down, RNT bench, lat strength BB smash continue prone scap exercise, rear delt strength. 45 deg teres pull down scap pull up       Shukri Frederick PT, DPT #[id number]

## 2024-06-13 ENCOUNTER — TREATMENT (OUTPATIENT)
Dept: PHYSICAL THERAPY | Facility: CLINIC | Age: 19
End: 2024-06-13
Payer: COMMERCIAL

## 2024-06-13 DIAGNOSIS — M54.9 UPPER BACK PAIN, CHRONIC: ICD-10-CM

## 2024-06-13 DIAGNOSIS — M25.511 BILATERAL SHOULDER PAIN: ICD-10-CM

## 2024-06-13 DIAGNOSIS — M54.2 PAIN, NECK: ICD-10-CM

## 2024-06-13 DIAGNOSIS — M25.512 BILATERAL SHOULDER PAIN: ICD-10-CM

## 2024-06-13 DIAGNOSIS — G89.29 UPPER BACK PAIN, CHRONIC: ICD-10-CM

## 2024-06-13 PROCEDURE — 97110 THERAPEUTIC EXERCISES: CPT | Mod: GP

## 2024-06-13 ASSESSMENT — PAIN SCALES - GENERAL: PAINLEVEL_OUTOF10: 0 - NO PAIN

## 2024-06-13 ASSESSMENT — PAIN - FUNCTIONAL ASSESSMENT: PAIN_FUNCTIONAL_ASSESSMENT: 0-10

## 2024-06-13 NOTE — PROGRESS NOTES
Physical Therapy  Physical Therapy Orthopedic Progress Note    Patient Name: Shon Jiménez  MRN: [id number]  Today's Date: 6/13/2024  Time Calculation  Start Time: 1649  Stop Time: 1730  Time Calculation (min): 41 min    Insurance:  Visit number: 10 of 15 (15 visits through 7/19/24)  Authorization info: yes  Insurance Type: [employer]    General:  Reason for Referral: B shoulder and neck pain  Referred By: Earl ROLDAN    Current Problem  1. Upper back pain, chronic  Follow Up In Physical Therapy      2. Bilateral shoulder pain  Follow Up In Physical Therapy      3. Pain, neck  Follow Up In Physical Therapy          Precautions: none  Precautions  Precautions Comment: none      Subjective:    Patient reports no pain at this time just still feeling weaker on L side and not as confident in strength training and age appropriate activity.    Current Condition:   Better    Pain:  Pain Assessment: 0-10  Pain Score: 0 - No pain  Pain Location: Shoulder  Location: R collar bone  Description: aching  Aggravating Factors: horizontal abduction / backward reaching  Relieving Factors:  Rest, cuff stability    Self Reported Function (0-100%) = 90%  (100% being back to PLOF)    Objective:  Objective     Pull forward & update pertinent objective measures taken at evaluation/last progress note  Shoulder  Cervical AROM  Cervical flexion: (80°): 100%  Cervical extension: (50°): 100%  Cervical Rotation: (80°): 100% P!  Cervical rotation left: (80°): 100% P!  Cervical sidebend right: (45°): 75%  Cervical sidebend left: (45°): 75%  Shoulder AROM  R Shoulder flexion: (180°): 170  L Shoulder flexion: (180°): 170  R shoulder abduction: (180°): 180  L Shoulder abduction: (180°): 180  R Shoulder ER: (90°): 90  L shoulder ER: (90°): 90  R shoulder IR: (70°): T4  L shoulder IR: (70°): T3  Shoulder PROM  R shoulder flexion: (180°): 180  L shoulder flexion: (180°): 180  R shoulder ER: (90°): 110  L shoulder ER: (90°): 90  R shoulder IR: (70°): 45  L  shoulder IR: (70°): 30     Shoulder Strength  R shoulder flexion: (5/5): 5 (HHD=33.1N)  L shoulder flexion: (5/5): 5 (HHD=30.5N)  R shoulder abduction: (5/5): 5 (HHD=36.3N)  L shoulder abduction: (5/5): 5 (HHD=36.3N)  R shoulder ER: (5/5): 5 (HHD=24.3N)@0; @90=17.2; @ 90-90=19.9N  L shoulder ER: (5/5): 5 (HHD=22.2N)@0; @90= 14.8N; @90-90=20.3N  R shoulder IR: (5/5) : 5 (HHD=44.6N)  L shoulder IR: (5/5): 5 (HHD=34.4)  Scapular MMT  R lower trapezius: (5/5): 4/5  L lower trapezius: (5/5): 4+/5  R middle trapezius: (5/5): 4+/5  L middle trapezius: (5/5): 4+/5  L rhomboids: (5/5): 4+/5  R serratus anterior: (5/5): 5  L serratus anterior: (5/5): 5    1RM Bench Press: 170lbs    3RM DB Shoulder Press: R= 35 L= 30            Outcome Measures: Updated 6/13/2024        EDUCATION: home exercise program, plan of care, activity modifications, pain management, and injury pathology       Goals: Updated 6/13/2024  Active       PT Problem       PT Goal 1 (Met)       Start:  04/05/24    Expected End:  05/17/24    Resolved:  06/13/24    ?Short Term Goals  1. Pt will demonstrated improvements in shoulder/scapular strength, specifically to 4+/5 in 6 weeks, in order to progress back to PLOF.    2. Pt will demonstrate improvements in shoulder Passive ROM, specifically flexion >170 in 6 weeks, in order to improve functional level.     3. Pt will demonstrate the ability to perform overhead activities with minimal pain (2-3/10 pain) in 6 weeks, in order to return to pain free prior level.     4. Pt will demonstrate improved standing posture and proper shoulder and scapular control with overhead activities in 6 weeks, in order to decrease stress and strain with activities.    5. Pt will demonstrate Ind ability with HEP.           PT Goal 2 (Progressing)       Start:  04/05/24    Expected End:  06/28/24       Long Term Goals  1. Pt will demonstrated improvements and symmetry in shoulder/scapular strength to 5/5 in 12 weeks, in order to return  to PLOF.    2. Pt will demonstrate full and symmetrical Active/Passive shoulder ROM, specifically in 12 weeks, in order to return to prior overhead function.    3. Pt will demonstrate the ability to complete all functional activities with no pain in 12 weeks.     4. Decrease quick dash by 6 points in 12 weeks.              Plan of care was developed with input and agreement by the patient      Treatment Performed:    Codes:  TE x3  Therapeutic Exercise  Therapeutic Exercise Activity 1: PT edu (px, dx, home program x13min)  Therapeutic Exercise Activity 2: prone scap (Y & T- x20 ea both)  Therapeutic Exercise Activity 3: bench press (-618-165-170=1RM)  Therapeutic Exercise Activity 4: DB OHP (4 sets work up to 3RM: 30L; 35R)      Assessment:   The focus of the session was Strengthening, ROM, Motor Control, Dynamic Stability Training, and functional training. The pt demonstrated Good and Improving tolerance to the noted exercises today. The pt is demonstrated Good and Improving progress in skilled rehab at this time. The pt is still limited in overall Strength, ROM, Motor control, and Pain at this time. The pt continues to be a good candidate for skilled PT, in order to further improve Strength, ROM, Motor control, and Pain.            Plan: Updated 6/13/2024     Planned Interventions include: therapeutic exercise, self-care home management, manual therapy, therapeutic activities, gait training, neuromuscular coordination, vasopneumatic, dry needling, aquatic therapy  Frequency: 1 x Week  Duration: 5 weeks      Shukri Frederick PT, DPT #[id number]

## 2024-06-24 ENCOUNTER — TREATMENT (OUTPATIENT)
Dept: PHYSICAL THERAPY | Facility: CLINIC | Age: 19
End: 2024-06-24
Payer: COMMERCIAL

## 2024-06-24 DIAGNOSIS — G89.29 UPPER BACK PAIN, CHRONIC: ICD-10-CM

## 2024-06-24 DIAGNOSIS — M54.9 UPPER BACK PAIN, CHRONIC: ICD-10-CM

## 2024-06-24 DIAGNOSIS — M25.512 BILATERAL SHOULDER PAIN: ICD-10-CM

## 2024-06-24 DIAGNOSIS — M54.2 PAIN, NECK: ICD-10-CM

## 2024-06-24 DIAGNOSIS — M25.511 BILATERAL SHOULDER PAIN: ICD-10-CM

## 2024-06-24 PROCEDURE — 97112 NEUROMUSCULAR REEDUCATION: CPT | Mod: GP

## 2024-06-24 PROCEDURE — 97110 THERAPEUTIC EXERCISES: CPT | Mod: GP

## 2024-06-24 ASSESSMENT — PAIN SCALES - GENERAL: PAINLEVEL_OUTOF10: 0 - NO PAIN

## 2024-06-24 ASSESSMENT — PAIN - FUNCTIONAL ASSESSMENT: PAIN_FUNCTIONAL_ASSESSMENT: 0-10

## 2024-06-24 NOTE — PROGRESS NOTES
Physical Therapy  Physical Therapy Treatment    Patient Name: Shon Jiménez  MRN: [id number]  Today's Date: 2024  Time Calculation  Start Time: 1625  Stop Time: 1721  Time Calculation (min): 56 min    Visit Count:   Auth:yes  Insurance: [facility]  15 visits including today thru 24     Current Problem  1. Upper back pain, chronic  Follow Up In Physical Therapy      2. Bilateral shoulder pain  Follow Up In Physical Therapy      3. Pain, neck  Follow Up In Physical Therapy                    General  Reason for Referral: B shoulder and neck pain  Referred By: Earl ROLDAN    Pain  Pain Assessment: 0-10  0-10 (Numeric) Pain Score: 0 - No pain  Pain Location: Shoulder    Subjective:   Patient reports feeling really good, bench getting much better.  HEP Compliance: good    Objective:   No longer anterior translation of clavicle on sternum on R with horizontal abduction  Internal rotation and scap anterior tilt with eccentric on shoulder press.  Treatments:   Therapeutic Exercise  Therapeutic Exercise Performed: Yes  Therapeutic Exercise Activity 1: PT edu (px, dx, home program x13min)  Therapeutic Exercise Activity 2: prone lock 1 (2lbs - [id number])  Therapeutic Exercise Activity 3: prone lock 2 ([id number])  Therapeutic Exercise Activity 4: prone lock 3 (2lbs [id number])  Therapeutic Exercise Activity 5: prone ER/ER to W (x20/x20- 2lbs both)  Therapeutic Exercise Activity 6: prone darlin and devil (x10)  Therapeutic Exercise Activity 7: prone Y breath combo (x8 1.5 press)  Therapeutic Exercise Activity 8: band Oh press (RTB - 2x10- slow ecc)  Therapeutic Exercise Activity 9: KB upside down press (4 kg- 2x10)  Therapeutic Exercise Activity 10: towel  high row  Therapeutic Exercise Activity 11: hevay band  farmer carry (dbl black + [id number])         TE x3 NMR x1  Assessment:   The focus of the session was Strengthening, ROM, Motor Control, Dynamic Stability Training, and functional training. The pt  demonstrated Good and Improving tolerance to the noted exercises today. The pt is demonstrated Good and Improving progress in skilled rehab at this time. The pt is still limited in overall Strength, Motor control, and Pain at this time. The pt continues to be a good candidate for skilled PT, in order to further improve Strength, Motor control, and Pain. Pt progressing really well, approaching discharge just mild pain noted with anterior tilt of scap at start position of OHP after eccentric, continue to progress  strength, scap and cuff stability to decrease unwanted motion and reduce pain.                  Education: priming lats and scap stabilizers before bench    Plan:     Review  and OHP and pull strength. DC?       Shukri Frederick PT, DPT #[id number]

## 2024-07-09 ENCOUNTER — DOCUMENTATION (OUTPATIENT)
Dept: PHYSICAL THERAPY | Facility: CLINIC | Age: 19
End: 2024-07-09
Payer: COMMERCIAL

## 2024-07-09 ENCOUNTER — APPOINTMENT (OUTPATIENT)
Dept: PHYSICAL THERAPY | Facility: CLINIC | Age: 19
End: 2024-07-09
Payer: COMMERCIAL

## 2024-07-09 NOTE — PROGRESS NOTES
Physical Therapy  Discharge Summary    Referral/Discharge Info:  Date of Discharge: 7/9/24  Date of Last Visit: 6/24/24  Date of Evaluation: 4/5/24  Number of Visits Attended: 11  Referred by: B shoulder and neck pain   Referred for: Vivek Elliott MD     Problems/Issues Addressed:  Neck and shoulder pain and instability      Status at Discharge:  Goals met with home program D/C    Reason for Discharge:  Achieved all and/or most goals         Shukri Frederick, PT

## 2024-07-09 NOTE — PROGRESS NOTES
Physical Therapy  Discharge Summary        Patient Name: Shon Jiméenz  MRN: [id number]  Today's Date: 7/9/2024       Visit Count: 12/30  Auth:yes  Insurance: [facility]  15 visits including today thru 07/19/24     Current Problem  No diagnosis found.                General       Pain       Subjective:   Patient reports feeling really good, bench getting much better.  HEP Compliance: good    Level of function compared to PLOF: ***%    Objective:   No longer anterior translation of clavicle on sternum on R with horizontal abduction  Internal rotation and scap anterior tilt with eccentric on shoulder press.  Treatments:             TE x3 NMR x1  Assessment:   The focus of the session was Strengthening, ROM, Motor Control, Dynamic Stability Training, and functional training. The pt demonstrated Good and Improving tolerance to the noted exercises today. The pt is demonstrated Good and Improving progress in skilled rehab at this time. The pt is still limited in overall Strength, Motor control, and Pain at this time. The pt continues to be a good candidate for skilled PT, in order to further improve Strength, Motor control, and Pain. Pt progressing really well, approaching discharge just mild pain noted with anterior tilt of scap at start position of OHP after eccentric, continue to progress  strength, scap and cuff stability to decrease unwanted motion and reduce pain.                  Education: priming lats and scap stabilizers before bench    Plan:     Review  and OHP and pull strength. DC?   Referral/Discharge Info:  Date of Discharge: ***  Date of Last Visit: ***  Date of Evaluation: ***  Number of Visits Attended: ***  Referred by: ***  Referred for: ***    Problems/Issues Addressed:  ***      Status at Discharge:  ***    Reason for Discharge:  {Reason for Discharge:40170}    Shukri Frederick PT, DPT #[id number]

## 2025-02-12 ENCOUNTER — HOSPITAL ENCOUNTER (EMERGENCY)
Facility: HOSPITAL | Age: 20
Discharge: HOME | End: 2025-02-12
Payer: COMMERCIAL

## 2025-02-12 ENCOUNTER — APPOINTMENT (OUTPATIENT)
Dept: RADIOLOGY | Facility: HOSPITAL | Age: 20
End: 2025-02-12
Payer: COMMERCIAL

## 2025-02-12 VITALS
HEIGHT: 70 IN | OXYGEN SATURATION: 98 % | HEART RATE: 71 BPM | SYSTOLIC BLOOD PRESSURE: 135 MMHG | BODY MASS INDEX: 21.7 KG/M2 | WEIGHT: 151.57 LBS | TEMPERATURE: 98.7 F | RESPIRATION RATE: 18 BRPM | DIASTOLIC BLOOD PRESSURE: 86 MMHG

## 2025-02-12 DIAGNOSIS — S09.90XA CLOSED HEAD INJURY, INITIAL ENCOUNTER: ICD-10-CM
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
## 2025-02-12 PROCEDURE — 73060 X-RAY EXAM OF HUMERUS: CPT | Mod: LT

## 2025-02-12 PROCEDURE — 99284 EMERGENCY DEPT VISIT MOD MDM: CPT

## 2025-02-12 PROCEDURE — 73060 X-RAY EXAM OF HUMERUS: CPT | Mod: LEFT SIDE | Performed by: RADIOLOGY

## 2025-02-12 PROCEDURE — 73030 X-RAY EXAM OF SHOULDER: CPT | Mod: LEFT SIDE | Performed by: RADIOLOGY

## 2025-02-12 PROCEDURE — 73030 X-RAY EXAM OF SHOULDER: CPT | Mod: LT

## 2025-02-12 RX ORDER — IBUPROFEN 600 MG/1
600 TABLET ORAL ONCE
Status: DISCONTINUED | OUTPATIENT
Start: 2025-02-12 | End: 2025-02-13 | Stop reason: HOSPADM

## 2025-02-12 RX ORDER — ACETAMINOPHEN 325 MG/1
650 TABLET ORAL ONCE
Status: DISCONTINUED | OUTPATIENT
Start: 2025-02-12 | End: 2025-02-13 | Stop reason: HOSPADM

## 2025-02-12 ASSESSMENT — COLUMBIA-SUICIDE SEVERITY RATING SCALE - C-SSRS
2. HAVE YOU ACTUALLY HAD ANY THOUGHTS OF KILLING YOURSELF?: NO
1. IN THE PAST MONTH, HAVE YOU WISHED YOU WERE DEAD OR WISHED YOU COULD GO TO SLEEP AND NOT WAKE UP?: NO
6. HAVE YOU EVER DONE ANYTHING, STARTED TO DO ANYTHING, OR PREPARED TO DO ANYTHING TO END YOUR LIFE?: NO

## 2025-02-12 ASSESSMENT — PAIN - FUNCTIONAL ASSESSMENT: PAIN_FUNCTIONAL_ASSESSMENT: 0-10

## 2025-02-12 ASSESSMENT — PAIN SCALES - GENERAL: PAINLEVEL_OUTOF10: 5 - MODERATE PAIN

## 2025-02-13 NOTE — ED PROVIDER NOTES
Chief Complaint   Patient presents with    Fall           Facial Injury    Shoulder Pain     Left shoulder     HPI:   Shon Jiménez is an 19 y.o. male presenting to the ED for head injury.  Explains that he experienced a mechanical fall on the ice and landed on his face.  Patient did sustain a laceration to the bridge of his nose.  He denies loss of consciousness dizziness or vision changes.   Patient also reports pain in his left shoulder anteriorly worsened with movements that require him to reach behind himself.  His pain in the shoulder is anterior and will radiate down to the elbow.  He has no paresthesias or weaknesses.      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  No family history on file.     Physical Exam  Vitals and nursing note reviewed.   Constitutional:       General: He is not in acute distress.     Appearance: He is well-developed.   HENT:      Head: Normocephalic and atraumatic.      Right Ear: Tympanic membrane normal.      Left Ear: Tympanic membrane normal.      Nose: Nose normal.      Comments: Superficial skin abrasion over the bridge of the nose limited to the epidermis and dermis.  No nasal septal hematoma.  No significant bruising or deformity     Mouth/Throat:      Mouth: Mucous membranes are moist.      Pharynx: Oropharynx is clear.   Eyes:      Extraocular Movements: Extraocular movements intact.      Conjunctiva/sclera: Conjunctivae normal.      Pupils: Pupils are equal, round, and reactive to light.   Cardiovascular:      Rate and Rhythm: Normal rate and regular rhythm.      Heart sounds: No murmur heard.  Pulmonary:      Effort: Pulmonary effort is  normal. No respiratory distress.      Breath sounds: Normal breath sounds.   Abdominal:      Palpations: Abdomen is soft.      Tenderness: There is no abdominal tenderness.   Musculoskeletal:         General: No swelling.      Cervical back: Neck supple.   Skin:     General: Skin is warm and dry.      Capillary Refill: Capillary refill takes less than 2 seconds.   Neurological:      General: No focal deficit present.      Mental Status: He is alert and oriented to person, place, and time.   Psychiatric:         Mood and Affect: Mood normal.        VS: As documented in the triage note and EMR flowsheet from this visit were reviewed.    Medical Decision Making: This is a 19-year-old male presenting to the ED for chief complaint of a fall.  Explains he experienced a mechanical fall on the ice and sustained a laceration over the bridge of his nose.  He did not lose consciousness and had no neurological deficits he no Jacques sign or raccoon eyes a CT was not ordered.  I had low suspicion for fracture and there was no obvious bruising or deformity.  There is no sick nasal septal hematoma.  The wound was rather superficial and somewhat gaping however there is no skin to repair with sutures.  The wound was cleansed and discussed wound care for home-going.  X-rays of his left shoulder did not show evidence of a dislocation he had full range of motion he is directly tender anteriorly but has good strength have low suspicion for tendinous rupture or AC separation as he has no tenderness at the AC.  Discussed that I will not be using a sling and would like him working through gentle range of motion.  Ibuprofen and Tylenol for pain control for home-going.  Tetanus was updated today.  Understands return precautions.  Diagnoses as of 02/12/25 2210   Fall, initial encounter   Closed head injury, initial encounter     Counseling: Spoke with the patient and discussed today´s findings, in addition to providing specific details for the  plan of care and expected course.  Patient was given the opportunity to ask questions.    Discussed return precautions and importance of follow-up.  Advised to follow-up with PCP.  I specifically advised to return to the ED for changing or worsening symptoms, new symptoms, complaint specific precautions, and precautions listed on the discharge paperwork.  Educated on the common potential side effects of medications prescribed.    I advised the patient that the emergency evaluation and treatment provided today doesn't end their need for medical care. It is very important that they follow-up with their primary care provider or other specialist as instructed.    The plan of care was mutually agreed upon with the patient. The patient and/or family were given the opportunity to ask questions. All questions asked today in the ED were answered to the best of my ability with today's information.    This report was transcribed using voice recognition software.  Every effort was made to ensure accuracy, however, inadvertently computerized transcription errors may be present.       Damion Aquino PA-C  02/12/25 [id number]

## 2025-02-13 NOTE — ED TRIAGE NOTES
Pt reports to ed with complaint with of fall. Pt fell on face on ice. Braced self with left arm. Denies LOC. No blood thinner. Pt able to move arm.

## 2025-02-13 NOTE — DISCHARGE INSTRUCTIONS
You have been seen at a [facility].  Please follow-up with your primary care provider in the next 1 to 2 days for further evaluation and routine follow-up.  Please return to the emergency room if having any worsening symptoms.  Please follow-up with any specialists if discussed during your emergency room stay.